# Patient Record
Sex: MALE | Race: WHITE | Employment: FULL TIME | URBAN - METROPOLITAN AREA
[De-identification: names, ages, dates, MRNs, and addresses within clinical notes are randomized per-mention and may not be internally consistent; named-entity substitution may affect disease eponyms.]

---

## 2017-03-30 ENCOUNTER — TELEPHONE (OUTPATIENT)
Dept: GASTROENTEROLOGY | Facility: CLINIC | Age: 47
End: 2017-03-30

## 2017-03-30 NOTE — TELEPHONE ENCOUNTER
I received this message from out patient advocate team which this patient would like to schedule a colonoscopy with Dr. Samuel Florian. He has never seen any of our doctors and would like to be scheduled for a colonoscopy.  I called this patient and left a message to c

## 2017-03-30 NOTE — TELEPHONE ENCOUNTER
This patient called back which he was asking what the process was to schedule a colonoscopy. I informed him that he should make an office visit appointment then from there we will schedule the colon screening.  The patient agreed with this plan and will rj